# Patient Record
Sex: MALE | Race: WHITE | NOT HISPANIC OR LATINO | Employment: OTHER | URBAN - METROPOLITAN AREA
[De-identification: names, ages, dates, MRNs, and addresses within clinical notes are randomized per-mention and may not be internally consistent; named-entity substitution may affect disease eponyms.]

---

## 2017-03-29 ENCOUNTER — OFFICE VISIT (OUTPATIENT)
Dept: CARDIOLOGY | Facility: CLINIC | Age: 62
End: 2017-03-29

## 2017-03-29 VITALS
SYSTOLIC BLOOD PRESSURE: 136 MMHG | HEART RATE: 60 BPM | HEIGHT: 72 IN | DIASTOLIC BLOOD PRESSURE: 82 MMHG | BODY MASS INDEX: 33 KG/M2 | WEIGHT: 243.6 LBS

## 2017-03-29 DIAGNOSIS — I10 ESSENTIAL HYPERTENSION: ICD-10-CM

## 2017-03-29 DIAGNOSIS — I48.0 PAROXYSMAL ATRIAL FIBRILLATION (HCC): Primary | ICD-10-CM

## 2017-03-29 PROCEDURE — 93000 ELECTROCARDIOGRAM COMPLETE: CPT | Performed by: INTERNAL MEDICINE

## 2017-03-29 PROCEDURE — 99214 OFFICE O/P EST MOD 30 MIN: CPT | Performed by: INTERNAL MEDICINE

## 2017-03-29 RX ORDER — SIMVASTATIN 20 MG
20 TABLET ORAL NIGHTLY
COMMUNITY
End: 2017-03-29

## 2017-03-29 RX ORDER — CHLORAL HYDRATE 500 MG
1000 CAPSULE ORAL
COMMUNITY

## 2017-03-29 RX ORDER — METOPROLOL SUCCINATE 25 MG/1
25 TABLET, EXTENDED RELEASE ORAL DAILY
COMMUNITY

## 2017-03-29 RX ORDER — AMLODIPINE BESYLATE 10 MG/1
0.5 TABLET ORAL DAILY
COMMUNITY
End: 2019-04-17

## 2017-03-29 RX ORDER — ATORVASTATIN CALCIUM 20 MG/1
20 TABLET, FILM COATED ORAL DAILY
COMMUNITY

## 2017-03-29 RX ORDER — LISINOPRIL 10 MG/1
10 TABLET ORAL 2 TIMES DAILY
COMMUNITY

## 2017-03-29 RX ORDER — ASPIRIN 325 MG
325 TABLET ORAL DAILY
COMMUNITY

## 2017-03-29 RX ORDER — COLCHICINE 0.6 MG/1
0.6 TABLET ORAL DAILY
COMMUNITY

## 2017-03-29 RX ORDER — OMEPRAZOLE 20 MG/1
20 CAPSULE, DELAYED RELEASE ORAL DAILY
COMMUNITY

## 2017-03-29 RX ORDER — MECLIZINE HCL 25MG 25 MG/1
25 TABLET, CHEWABLE ORAL AS NEEDED
COMMUNITY

## 2017-03-29 NOTE — PROGRESS NOTES
Chris Huitron  1955  [phone]      03/29/2017    [facility] CARDIOLOGY     Osvaldo Len Snyder, DO  [address] [facility] [place] [address]    Chief Complaint   Patient presents with   • Atrial Fibrillation       PROBLEM LIST:  1. Paroxysmal atrial fibrillation: CHADSvasc = 1  a. Onset 1989 with a second episode 1993 and recurrent on June 2009.   b. Coumadin initiated June 2009.  c. Echocardiogram, June 2009, with mild to moderate AI, mild LAE, EF 60%.  d. Holter monitor, 08/26/2009, with heart rate 44-98; average 61 BPM with 66 PVCs, 99 PACs.  e. Echocardiogram, 10/23/2013: Normal LV size and function. No significant valvular insufficiency.   2. Nonobstructive CAD:  a. Left heart catheterization, 2005, with a 40% LAD, 30% RCA, normal EF with 30% ostial left renal artery stenosis.   b. Thallium GXT, 07/08/2009, with no ischemia, EF 61%.   3. Hypertension.  4. Hyperlipidemia.  5. Gout.  6. Tobacco abuse - resolved  7. Hemochromatosis on phlebotomy.   8. Leg fracture, status post repair.   9. Status post small bowel resection, August 2015.    Allergies  No Known Allergies    Current Medications    Current Outpatient Prescriptions:   •  amLODIPine (NORVASC) 10 MG tablet, Take 0.5 mg by mouth Daily., Disp: , Rfl:   •  aspirin 325 MG tablet, Take 325 mg by mouth Daily., Disp: , Rfl:   •  atorvastatin (LIPITOR) 20 MG tablet, Take 20 mg by mouth Daily., Disp: , Rfl:   •  colchicine 0.6 MG tablet, Take 0.6 mg by mouth Daily., Disp: , Rfl:   •  lisinopril (PRINIVIL,ZESTRIL) 10 MG tablet, Take 10 mg by mouth 2 (Two) Times a Day., Disp: , Rfl:   •  meclizine 25 MG chewable tablet chewable tablet, Chew 25 mg As Needed., Disp: , Rfl:   •  metoprolol succinate XL (TOPROL-XL) 25 MG 24 hr tablet, Take 25 mg by mouth Daily., Disp: , Rfl:   •  Omega-3 Fatty Acids (FISH OIL) 1000 MG capsule capsule, Take 1,000 mg by mouth Daily With Breakfast., Disp: , Rfl:   •  omeprazole (priLOSEC) 20 MG  "capsule, Take 20 mg by mouth Daily., Disp: , Rfl:     History of Present Illness   HPI    Pt presents for follow up of Paroxysmal Atrial Fibrillation. Since we last saw the pt, pt denies any AF episodes, SOB, CP, LH, and dizziness. Denies any bleeding, or TIA/CVA symptoms. He did have a partial bowel resection since we last saw him. Overall feels well.  He checks his BP regularly at home and they run in 110-120 systolic. His HRs are 50-70s.     ROS:  General:  Denies fatigue, weight gain or loss  Cardiovascular:  Denies CP, PND, syncope, near syncope, edema or palpitations.  Pulmonary:  Denies DEL ANGEL, cough, or wheezing      Vitals:    03/29/17 0904   BP: 136/82   BP Location: Right arm   Patient Position: Sitting   Pulse: 60   Weight: 243 lb 9.6 oz (110 kg)   Height: 72\" (182.9 cm)     PE:  General: NAD  Neck: no JVD, no carotid bruits, no TM  Heart RRR, NL S1, S2, S4 present, no rubs, murmurs  Lungs: CTA, no wheezes, rhonchi, or rales  Abd: soft, non-tender, NL BS  Ext: No musculoskeletal deformities, no edema, cyanosis, or clubbing  Psych: normal mood and affect    Diagnostic Data:    ECG 12 Lead  Date/Time: 3/29/2017 9:16 AM  Performed by: CAIO REDDY  Authorized by: CAIO REDDY   Previous ECG: no previous ECG available  Rhythm: sinus bradycardia  BPM: 52              1. Paroxysmal atrial fibrillation    2. Essential hypertension          Plan:  1) Paroxysmal Atrial Fibrillation: no recurrence  Continue present medications.   2) Anticoagulation: CHADSvasc = 1  Continue ASA  3) HTN- controlled  Wt loss, exercise, salt reduction    F/up in 12 months    Scribed for Caio Reddy MD by Kassy Machado PA-C. 3/29/2017  9:16 AM    I, Caio Reddy MD, personally performed the services face to face as described in this documentation and as scribed by the above named individual in my presence, and it is both accurate and complete.  3/29/2017  9:16 AM  "

## 2018-04-11 ENCOUNTER — OFFICE VISIT (OUTPATIENT)
Dept: CARDIOLOGY | Facility: CLINIC | Age: 63
End: 2018-04-11

## 2018-04-11 VITALS
DIASTOLIC BLOOD PRESSURE: 80 MMHG | BODY MASS INDEX: 32.1 KG/M2 | WEIGHT: 237 LBS | SYSTOLIC BLOOD PRESSURE: 110 MMHG | HEIGHT: 72 IN | HEART RATE: 56 BPM

## 2018-04-11 DIAGNOSIS — I10 ESSENTIAL HYPERTENSION: ICD-10-CM

## 2018-04-11 DIAGNOSIS — I48.0 PAROXYSMAL ATRIAL FIBRILLATION (HCC): Primary | ICD-10-CM

## 2018-04-11 PROCEDURE — 99212 OFFICE O/P EST SF 10 MIN: CPT | Performed by: INTERNAL MEDICINE

## 2018-04-11 NOTE — PROGRESS NOTES
Chris Huitron  1955  [phone]      04/11/2018    [facility] CARDIOLOGY     Osvaldo Len Snyder, DO  [address] [facility] [place] [address]    Chief Complaint   Patient presents with   • Atrial Fibrillation       PROBLEM LIST:  1. Paroxysmal atrial fibrillation: CHADSvasc = 1  a. Onset 1989 with a second episode 1993 and recurrent on June 2009.   b. Coumadin initiated June 2009.  c. Echocardiogram, June 2009, with mild to moderate AI, mild LAE, EF 60%.  d. Holter monitor, 08/26/2009, with heart rate 44-98; average 61 BPM with 66 PVCs, 99 PACs.  e. Echocardiogram, 10/23/2013: Normal LV size and function. No significant valvular insufficiency.   2. Nonobstructive CAD:  a. Left heart catheterization, 2005, with a 40% LAD, 30% RCA, normal EF with 30% ostial left renal artery stenosis.   b. Thallium GXT, 07/08/2009, with no ischemia, EF 61%.   3. Hypertension.  4. Hyperlipidemia.  5. Gout.  6. Tobacco abuse - resolved  7. Hemochromatosis on phlebotomy.   8. Leg fracture, status post repair.   9. Status post small bowel resection, August 2015    Allergies  No Known Allergies    Current Medications    Current Outpatient Prescriptions:   •  amLODIPine (NORVASC) 10 MG tablet, Take 0.5 mg by mouth Daily., Disp: , Rfl:   •  aspirin 325 MG tablet, Take 325 mg by mouth Daily., Disp: , Rfl:   •  atorvastatin (LIPITOR) 20 MG tablet, Take 20 mg by mouth Daily., Disp: , Rfl:   •  colchicine 0.6 MG tablet, Take 0.6 mg by mouth Daily., Disp: , Rfl:   •  lisinopril (PRINIVIL,ZESTRIL) 10 MG tablet, Take 10 mg by mouth 2 (Two) Times a Day., Disp: , Rfl:   •  meclizine 25 MG chewable tablet chewable tablet, Chew 25 mg As Needed., Disp: , Rfl:   •  metoprolol succinate XL (TOPROL-XL) 25 MG 24 hr tablet, Take 25 mg by mouth Daily., Disp: , Rfl:   •  Omega-3 Fatty Acids (FISH OIL) 1000 MG capsule capsule, Take 1,000 mg by mouth Daily With Breakfast., Disp: , Rfl:   •  omeprazole (priLOSEC) 20 MG  "capsule, Take 20 mg by mouth Daily., Disp: , Rfl:     History of Present Illness   HPI    Pt presents for follow up of Atrial fibrillation, HTN. Since we last saw the pt, pt denies any AF episodes, unusual SOB, CP, LH, and dizziness/syncope. Denies any hospitalizations, ER visits, bleeding, or TIA/CVA symptoms. Overall feels well. He went [place] [employer] and noticed some mild SOB with hills. He walks 4-5 times per week on flat ground and does well without SOB or CP. He brings in a list of his BPs which show systolic's 90s to 130s.     ROS:  General:  Denies fatigue, weight gain or loss  Cardiovascular:  Denies CP, PND, syncope, near syncope, edema or palpitations.  Pulmonary:  Denies DEL ANGEL, cough, or wheezing      Vitals:    04/11/18 0841   BP: 110/80   BP Location: Right arm   Patient Position: Sitting   Pulse: 56   Weight: 108 kg (237 lb)   Height: 182.9 cm (72\")     Body mass index is 32.14 kg/m².  PE:  General: NAD  Neck: no JVD, no carotid bruits, no TM  Heart RRR, NL S1, S2, S4 present, no rubs, murmurs  Lungs: CTA, no wheezes, rhonchi, or rales  Abd: soft, non-tender, NL BS  Ext: No musculoskeletal deformities, no edema, cyanosis, or clubbing  Psych: normal mood and affect    Diagnostic Data:      Procedures    1. Paroxysmal atrial fibrillation    2. Essential hypertension          Plan:  1) AF- no recurrences.   Continue present medications.   2) Anticoagulation: CHADSVasc =1  Continue ASA   3) HTN- controlled.   Wt loss, exercise, salt reduction    F/up in 12 months    Scribed for Murray Reddy MD by Kassy Machado PA-C. 4/11/2018  8:54 AM     I, Murray Reddy MD, personally performed the services described in this documentation as scribed by the above named individual in my presence, and it is both accurate and complete.  4/11/2018  9:08 AM        "

## 2019-04-17 ENCOUNTER — OFFICE VISIT (OUTPATIENT)
Dept: CARDIOLOGY | Facility: CLINIC | Age: 64
End: 2019-04-17

## 2019-04-17 VITALS
WEIGHT: 223 LBS | OXYGEN SATURATION: 95 % | HEIGHT: 72 IN | BODY MASS INDEX: 30.2 KG/M2 | SYSTOLIC BLOOD PRESSURE: 132 MMHG | HEART RATE: 54 BPM | DIASTOLIC BLOOD PRESSURE: 76 MMHG

## 2019-04-17 DIAGNOSIS — I10 ESSENTIAL HYPERTENSION: ICD-10-CM

## 2019-04-17 DIAGNOSIS — I48.0 PAROXYSMAL ATRIAL FIBRILLATION (HCC): Primary | ICD-10-CM

## 2019-04-17 PROCEDURE — 93000 ELECTROCARDIOGRAM COMPLETE: CPT | Performed by: INTERNAL MEDICINE

## 2019-04-17 PROCEDURE — 99213 OFFICE O/P EST LOW 20 MIN: CPT | Performed by: INTERNAL MEDICINE

## 2019-04-17 RX ORDER — VALACYCLOVIR HYDROCHLORIDE 1 G/1
2 TABLET, FILM COATED ORAL 2 TIMES DAILY
COMMUNITY

## 2019-04-17 NOTE — PROGRESS NOTES
Chris Huitron  1955    There is no work phone number on file.      04/17/2019    [facility] CARDIOLOGY     Osvaldo Snyder, DO  [address] [facility] [place] [address]    Chief Complaint   Patient presents with   • Atrial Fibrillation   • Hypertension       Problem List:   1. Paroxysmal atrial fibrillation: CHADSvasc = 2  a. Onset 1989 with a second episode 1993 and recurrent on June 2009.   b. Coumadin initiated June 2009.  c. Echocardiogram, June 2009, with mild to moderate AI, mild LAE, EF 60%.  d. Holter monitor, 08/26/2009, with heart rate 44-98; average 61 BPM with 66 PVCs, 99 PACs.  e. Echocardiogram, 10/23/2013: Normal LV size and function. No significant valvular insufficiency.   2. Nonobstructive CAD:  a. Left heart catheterization, 2005, with a 40% LAD, 30% RCA, normal EF with 30% ostial left renal artery stenosis.   b. Thallium GXT, 07/08/2009, with no ischemia, EF 61%.   3. Hypertension.  4. Hyperlipidemia.  5. Gout.  6. Tobacco abuse - resolved  7. Hemochromatosis on phlebotomy.   8. Leg fracture, status post repair.   9. Status post small bowel resection, August 2015      Allergies  No Known Allergies    Current Medications    Current Outpatient Medications:   •  aspirin 325 MG tablet, Take 325 mg by mouth Daily., Disp: , Rfl:   •  atorvastatin (LIPITOR) 20 MG tablet, Take 20 mg by mouth Daily., Disp: , Rfl:   •  colchicine 0.6 MG tablet, Take 0.6 mg by mouth Daily., Disp: , Rfl:   •  lisinopril (PRINIVIL,ZESTRIL) 10 MG tablet, Take 10 mg by mouth 2 (Two) Times a Day., Disp: , Rfl:   •  meclizine 25 MG chewable tablet chewable tablet, Chew 25 mg As Needed., Disp: , Rfl:   •  metoprolol succinate XL (TOPROL-XL) 25 MG 24 hr tablet, Take 25 mg by mouth Daily., Disp: , Rfl:   •  Omega-3 Fatty Acids (FISH OIL) 1000 MG capsule capsule, Take 1,000 mg by mouth Daily With Breakfast., Disp: , Rfl:   •  omeprazole (priLOSEC) 20 MG capsule, Take 20 mg by mouth Daily., Disp:  ", Rfl:   •  valACYclovir (VALTREX) 1000 MG tablet, Take 2 g by mouth 2 (Two) Times a Day., Disp: , Rfl:     History of Present Illness   HPI    Pt presents for follow up of atrial fibrillation. Since we last saw the pt, pt denies any AF episodes, SOB, CP.  Was having low BP's at home with associated dizziness, now off his Norvasc.  BP's improved.  Denies any hospitalizations, ER visits, bleeding, or TIA/CVA symptoms. Overall feels well.    ROS:  General:  Denies fatigue, weight gain or loss  Cardiovascular:  Denies CP, PND, syncope, near syncope, edema or palpitations.  Pulmonary:  Denies DEL ANGEL, cough, or wheezing      Vitals:    04/17/19 0854   BP: 132/76   BP Location: Left arm   Patient Position: Sitting   Pulse: 54   SpO2: 95%   Weight: 101 kg (223 lb)   Height: 182.9 cm (72\")     PE:  General: NAD  Neck: no JVD, no carotid bruits, no TM  Heart RRR, NL S1, S2, S4 present, no rubs, murmurs  Lungs: CTA, no wheezes, rhonchi, or rales  Abd: soft, non-tender, NL BS  Ext: No musculoskeletal deformities, no edema, cyanosis, or clubbing  Psych: normal mood and affect    Diagnostic Data:        ECG 12 Lead  Date/Time: 4/17/2019 9:23 AM  Performed by: Murray Reddy MD  Authorized by: Murray Reddy MD   Comparison: compared with previous ECG from 3/29/2017  Similar to previous ECG  Rhythm: sinus rhythm  BPM: 54              1. Paroxysmal atrial fibrillation (CMS/HCC)    2. Essential hypertension          Plan:  1) Paroxysmal atrial fibrillation:  - Maintaining NSR, no recurrence  - Continue present medications.   2) Anticoagulation:  - CHADSVASc = 2, on ASA  3) HTN:  - Was having low BP's at home associated with dizziness, improved off Norvasc.  - Wt loss, exercise, salt reduction    F/up in 12 months    Scribed for Murray Reddy MD by JANEL Jorgensen. 4/17/2019  9:27 AM       Murray LOW MD, personally performed the services face to face as described and documented by the above named " individual. I have made any necessary edits and it is both accurate and complete 4/17/2019  9:27 AM